# Patient Record
Sex: FEMALE | Race: WHITE | NOT HISPANIC OR LATINO | Employment: FULL TIME | URBAN - METROPOLITAN AREA
[De-identification: names, ages, dates, MRNs, and addresses within clinical notes are randomized per-mention and may not be internally consistent; named-entity substitution may affect disease eponyms.]

---

## 2018-11-15 ENCOUNTER — TRANSFERRED RECORDS (OUTPATIENT)
Dept: HEALTH INFORMATION MANAGEMENT | Facility: CLINIC | Age: 63
End: 2018-11-15

## 2019-01-18 ENCOUNTER — TRANSFERRED RECORDS (OUTPATIENT)
Dept: HEALTH INFORMATION MANAGEMENT | Facility: CLINIC | Age: 64
End: 2019-01-18

## 2019-04-03 ENCOUNTER — TRANSFERRED RECORDS (OUTPATIENT)
Dept: HEALTH INFORMATION MANAGEMENT | Facility: CLINIC | Age: 64
End: 2019-04-03

## 2019-07-18 DIAGNOSIS — H35.372 EPIRETINAL MEMBRANE (ERM) OF LEFT EYE: ICD-10-CM

## 2019-07-18 DIAGNOSIS — H18.002 KERATIC PRECIPITATES OF LEFT EYE: Primary | ICD-10-CM

## 2019-07-19 ENCOUNTER — OFFICE VISIT (OUTPATIENT)
Dept: OPHTHALMOLOGY | Facility: CLINIC | Age: 64
End: 2019-07-19
Attending: OPHTHALMOLOGY
Payer: COMMERCIAL

## 2019-07-19 DIAGNOSIS — H35.372 EPIRETINAL MEMBRANE (ERM) OF LEFT EYE: ICD-10-CM

## 2019-07-19 DIAGNOSIS — H18.002 KERATIC PRECIPITATES OF LEFT EYE: Primary | ICD-10-CM

## 2019-07-19 PROCEDURE — G0463 HOSPITAL OUTPT CLINIC VISIT: HCPCS | Mod: ZF

## 2019-07-19 PROCEDURE — 92133 CPTRZD OPH DX IMG PST SGM ON: CPT | Mod: ZF | Performed by: OPHTHALMOLOGY

## 2019-07-19 PROCEDURE — 92134 CPTRZ OPH DX IMG PST SGM RTA: CPT | Mod: ZF | Performed by: OPHTHALMOLOGY

## 2019-07-19 RX ORDER — CHOLECALCIFEROL (VITAMIN D3) 1250 MCG
1 CAPSULE ORAL DAILY
COMMUNITY
Start: 2010-01-01

## 2019-07-19 RX ORDER — ASPIRIN 81 MG/1
81 TABLET, CHEWABLE ORAL DAILY
COMMUNITY

## 2019-07-19 ASSESSMENT — REFRACTION_WEARINGRX
OS_SPHERE: -10.00
OD_CYLINDER: +2.00
OS_ADD: +2.25
OD_ADD: +2.25
OD_AXIS: 155
OS_AXIS: 015
SPECS_TYPE: PAL
OS_CYLINDER: +1.75
OD_SPHERE: -8.00

## 2019-07-19 ASSESSMENT — VISUAL ACUITY
METHOD: SNELLEN - LINEAR
OS_PH_CC: 20/40
OD_CC: 20/25
OD_CC+: +2
CORRECTION_TYPE: GLASSES
OS_CC: 20/100

## 2019-07-19 ASSESSMENT — TONOMETRY
IOP_METHOD: ICARE
OD_IOP_MMHG: 14
OS_IOP_MMHG: 15

## 2019-07-19 ASSESSMENT — EXTERNAL EXAM - RIGHT EYE: OD_EXAM: NORMAL

## 2019-07-19 ASSESSMENT — CUP TO DISC RATIO
OS_RATIO: 0.6
OD_RATIO: 0.3

## 2019-07-19 ASSESSMENT — EXTERNAL EXAM - LEFT EYE: OS_EXAM: NORMAL

## 2019-07-19 ASSESSMENT — SLIT LAMP EXAM - LIDS
COMMENTS: NORMAL
COMMENTS: NORMAL

## 2019-07-19 NOTE — LETTER
7/19/2019       RE: Chloé Bui  [address] [facility] [address]     Dear Colleague,    Thank you for referring your patient, Chloé Bui, to the EYE CLINIC at [facility]. Please see a copy of my visit note below.    HPI: Chloé Bui is a 63 year old female referred by Dr. Shetty for evaluation of possible uveitis left eye, given endothelial keratitic precipitates left eye first noted 1.18.19. Ms. Bui denies any history of uveitis and specifically states that the only time she used corticosteroid eye drops was post-operatively. She also denies history of intermittent blurry vision, intermittent eye redness/pain, and periocular rash/itching/pain.    Current ocular medications: none.  Prior ocular medications: latanoprost left eye (used post-PPV left eye) and standard post-surgical drops: atropine, Maxitrol.    Ocular history:   1. S/p Pars plana vitrectomy (PPV)/IVT/MP/AFx/ICG/laser for Epiretinal membrane left eye (Crystal, 11.15.18).   - History of mildly elevated intraocular pressure (20), possible steroid response, following  PPV   - Topical corticosteroids stopped at 2 week postop visit  2. ?Vitreous hemorrhage left eye, mentioned as a diagnosis in Dr. Rojas's notes without further explanation. Only notes from 11.16.18 - 1.18.19 were available for review.  3. Cataract left eye > right eye.    Medical history:  1. 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 Arthritis  3. Cervical disc bulging  4. Factor V Leiden  5. S/p removal of retained placenta (1981)    Review of systems: Positive for numbness/tingling in fingers and toes, joint pain/stiffness, and back pain. Negative for fatigue, weight loss, fevers, chills, night sweats, frequent headaches, seizure, fainting, , weakness, oral ulcers, genital ulcers, pathergy reaction, recurrent nosebleeds, sinusitis, hearing loss, tinnitus, chronic cough, chest  pain, shortness of breath, skin rash, tick bite, easy bruising, easy bleeding,  recurrent diarrhea, bloody stools, bloody urine.    Family history: Positive for psoriasis (sister), retinal detachment (2 sisters), glaucoma (mother), Retinopathy of prematurity (ROP) (brother).    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 Stopped smoking 25 years ago. No IV drug use. Drinks an average of 1 alcoholic beverage per day. Has lived in [place] well as the [place].    Laboratory/Imaging Results:  Chest x-ray 12.2.13 read as normal, without sequelae of old granulomatous disease.    Ocular Imaging:  Macular OCT 7.19.19:  Right eye: minimal non-central ERM, abnormal foveal contour without thickening, no cystoid macular edema  Left eye: residual temporal ERM, blunted foveal contour, no cystoid macular edema    Retinal nerve fiber layer OCT 7.19.19: within normal limits both eyes    Impression/Plan:  1. Central corneal endothelial pigment / keratic precipitates left eye. No active uveitis on examination today. No other findings suggestive of prior uveitis. In the absence of suggestive history and with normal corneal sensation, normal iris appearance without atrophy or TIDs, and no other findings to indicate active or prior uveitis, this finding is most likely post-surgical (note that topical corticosteroids were stopped 2 weeks post-op) or secondary to Fuchs heterochromic iridocyclitis (which does not require uveitis treatment) rather than a finding indicating a true primary uveitis. 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   - Observe    2. Cataract left eye > right eye, visually significant left eye.   - I recommend proceeding with cataract surgery left eye, without uveitis precautions. In particular, I do not recommend empiric anti-viral treatment at this time. I do recommend close  observation following cataract surgery to carefully monitor inflammation and treat appropriately.     3. Glaucoma suspect based on C:D asymmetry.   - Continue care with Dr. Shetty    Return to uveitis clinic as needed. I am happy to see Ms. Bui back if there are subsequent findings suggestive of uveitis.      Attending Physician Attestation:  Complete documentation of historical and exam elements from today's encounter can be found in the full encounter summary report (not reduplicated in this progress note).  I personally obtained the chief complaint(s) and history of present illness.  I confirmed and edited as necessary the review of systems, past medical/surgical history, family history, social history, and examination findings as documented by others; and I examined the patient myself.  I personally reviewed the relevant tests, images, and reports as documented above.  I formulated and edited as necessary the assessment and plan and discussed the findings and management plan with the patient and family.  - Lauren Garcia M.D.      Again, thank you for allowing me to participate in the care of your patient.      Sincerely,    Lauren Garcia MD

## 2019-07-19 NOTE — NURSING NOTE
"Chief Complaints and History of Present Illnesses   Patient presents with     Consult For     Chief Complaint(s) and History of Present Illness(es)     Consult For     Laterality: left eye    Quality: blurred vision    Severity: severe    Frequency: constantly    Associated symptoms: floaters.  Negative for eye pain and redness    Treatments tried: no treatments    Pain scale: 0/10              Comments     Referred by Dr Elieser Shetty for keratic precipitates LE    Vision \"seems worse\" in the LE since JHONNY 4/03/19. Has been updating gls and \"just can't keep up\" with the vision changes in the LE. RE, per Pt is \"asymptomatic.\"    F&F BE, longstanding and unchanging.     PoH: Vitrectomy LE 11.2018    Diane Lynn COT 9:15 AM July 19, 2019                     "

## 2019-07-19 NOTE — LETTER
7/19/2019       RE: Chloé Bui  [address] [facility] [address]     Dear Colleague,    Thank you for referring your patient, Chloé Bui, to the EYE CLINIC at [facility]. Please see a copy of my visit note below.    HPI: Chloé Bui is a 63 year old female referred by Dr. Shetty for evaluation of possible uveitis left eye, given endothelial keratitic precipitates left eye first noted 1.18.19. Ms. Bui denies any history of uveitis and specifically states that the only time she used corticosteroid eye drops was post-operatively. She also denies history of intermittent blurry vision, intermittent eye redness/pain, and periocular rash/itching/pain.    Current ocular medications: none.  Prior ocular medications: latanoprost left eye (used post-PPV left eye) and standard post-surgical drops: atropine, Maxitrol.    Ocular history:   1. S/p Pars plana vitrectomy (PPV)/IVT/MP/AFx/ICG/laser for Epiretinal membrane left eye (Crystal, 11.15.18).   - History of mildly elevated intraocular pressure (20), possible steroid response, following  PPV   - Topical corticosteroids stopped at 2 week postop visit  2. ?Vitreous hemorrhage left eye, mentioned as a diagnosis in Dr. Rojas's notes without further explanation. Only notes from 11.16.18 - 1.18.19 were available for review.  3. Cataract left eye > right eye.    Medical history:  1. 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 Arthritis  3. Cervical disc bulging  4. Factor V Leiden  5. S/p removal of retained placenta (1981)    Review of systems: Positive for numbness/tingling in fingers and toes, joint pain/stiffness, and back pain. Negative for fatigue, weight loss, fevers, chills, night sweats, frequent headaches, seizure, fainting, , weakness, oral ulcers, genital ulcers, pathergy reaction, recurrent nosebleeds, sinusitis, hearing loss, tinnitus, chronic cough, chest  pain, shortness of breath, skin rash, tick bite, easy bruising, easy bleeding,  recurrent diarrhea, bloody stools, bloody urine.    Family history: Positive for psoriasis (sister), retinal detachment (2 sisters), glaucoma (mother), Retinopathy of prematurity (ROP) (brother).    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 Stopped smoking 25 years ago. No IV drug use. Drinks an average of 1 alcoholic beverage per day. Has lived in [place] well as the [place].    Laboratory/Imaging Results:  Chest x-ray 12.2.13 read as normal, without sequelae of old granulomatous disease.    Ocular Imaging:  Macular OCT 7.19.19:  Right eye: minimal non-central ERM, abnormal foveal contour without thickening, no cystoid macular edema  Left eye: residual temporal ERM, blunted foveal contour, no cystoid macular edema    Retinal nerve fiber layer OCT 7.19.19: within normal limits both eyes    Impression/Plan:  1. Central corneal endothelial pigment / keratic precipitates left eye. No active uveitis on examination today. No other findings suggestive of prior uveitis. In the absence of suggestive history and with normal corneal sensation, normal iris appearance without atrophy or TIDs, and no other findings to indicate active or prior uveitis, this finding is most likely post-surgical (note that topical corticosteroids were stopped 2 weeks post-op) or secondary to Fuchs heterochromic iridocyclitis (which does not require uveitis treatment) rather than a finding indicating a true primary uveitis. 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   - Observe    2. Cataract left eye > right eye, visually significant left eye.   - I recommend proceeding with cataract surgery left eye, without uveitis precautions. In particular, I do not recommend empiric anti-viral treatment at this time. I do recommend close  observation following cataract surgery to carefully monitor inflammation and treat appropriately.     3. Glaucoma suspect based on enlarged C:D and C:D asymmetry.   - Continue care with Dr. Shetty    Return to uveitis clinic as needed. I am happy to see Ms. Bui back if there are subsequent findings suggestive of uveitis.      Attending Physician Attestation:  Complete documentation of historical and exam elements from today's encounter can be found in the full encounter summary report (not reduplicated in this progress note).  I personally obtained the chief complaint(s) and history of present illness.  I confirmed and edited as necessary the review of systems, past medical/surgical history, family history, social history, and examination findings as documented by others; and I examined the patient myself.  I personally reviewed the relevant tests, images, and reports as documented above.  I formulated and edited as necessary the assessment and plan and discussed the findings and management plan with the patient and family.  - Lauren Garcia M.D.      Again, thank you for allowing me to participate in the care of your patient.      Sincerely,    Lauren Garcia MD

## 2019-07-19 NOTE — PROGRESS NOTES
HPI: Chloé Bui is a 63 year old female referred by Dr. Shetty for evaluation of possible uveitis left eye, given endothelial keratitic precipitates left eye first noted 1.18.19. Ms. Bui denies any history of uveitis and specifically states that the only time she used corticosteroid eye drops was post-operatively. She also denies history of intermittent blurry vision, intermittent eye redness/pain, and periocular rash/itching/pain.    Current ocular medications: none.  Prior ocular medications: latanoprost left eye (used post-PPV left eye) and standard post-surgical drops: atropine, Maxitrol.    Ocular history:   1. S/p Pars plana vitrectomy (PPV)/IVT/MP/AFx/ICG/laser for Epiretinal membrane left eye (Crystal, 11.15.18).   - History of mildly elevated intraocular pressure (20), possible steroid response, following  PPV   - Topical corticosteroids stopped at 2 week postop visit  2. ?Vitreous hemorrhage left eye, mentioned as a diagnosis in Dr. Rojas's notes without further explanation. Only notes from 11.16.18 - 1.18.19 were available for review.  3. Cataract left eye > right eye.    Medical history:  1. 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 Arthritis  3. Cervical disc bulging  4. Factor V Leiden  5. S/p removal of retained placenta (1981)    Review of systems: Positive for numbness/tingling in fingers and toes, joint pain/stiffness, and back pain. Negative for fatigue, weight loss, fevers, chills, night sweats, frequent headaches, seizure, fainting, , weakness, oral ulcers, genital ulcers, pathergy reaction, recurrent nosebleeds, sinusitis, hearing loss, tinnitus, chronic cough, chest pain, shortness of breath, skin rash, tick bite, easy bruising, easy bleeding,  recurrent diarrhea, bloody stools, bloody urine.    Family history: Positive for psoriasis (sister), retinal detachment (2 sisters), glaucoma (mother), Retinopathy of prematurity (ROP)  (brother).    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 Stopped smoking 25 years ago. No IV drug use. Drinks an average of 1 alcoholic beverage per day. Has lived in [place] well as the [place].    Laboratory/Imaging Results:  Chest x-ray 12.2.13 read as normal, without sequelae of old granulomatous disease.    Ocular Imaging:  Macular OCT 7.19.19:  Right eye: minimal non-central ERM, abnormal foveal contour without thickening, no cystoid macular edema  Left eye: residual temporal ERM, blunted foveal contour, no cystoid macular edema    Retinal nerve fiber layer OCT 7.19.19: within normal limits both eyes    Impression/Plan:  1. Central corneal endothelial pigment / keratic precipitates left eye. No active uveitis on examination today. No other findings suggestive of prior uveitis. In the absence of suggestive history and with normal corneal sensation, normal iris appearance without atrophy or TIDs, and no other findings to indicate active or prior uveitis, this finding is most likely post-surgical (note that topical corticosteroids were stopped 2 weeks post-op) or secondary to Fuchs heterochromic iridocyclitis (which does not require uveitis treatment) rather than a finding indicating a true primary uveitis. 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   - Observe    2. Cataract left eye > right eye, visually significant left eye.   - I recommend proceeding with cataract surgery left eye, without uveitis precautions. In particular, I do not recommend empiric anti-viral treatment at this time. I do recommend close observation following cataract surgery to carefully monitor inflammation and treat appropriately.     3. Glaucoma suspect based on C:D asymmetry.   - Continue care with Dr. Shetty    Return to uveitis clinic as needed. I am happy to see Ms. Bui back if there are  subsequent findings suggestive of uveitis.      Attending Physician Attestation:  Complete documentation of historical and exam elements from today's encounter can be found in the full encounter summary report (not reduplicated in this progress note).  I personally obtained the chief complaint(s) and history of present illness.  I confirmed and edited as necessary the review of systems, past medical/surgical history, family history, social history, and examination findings as documented by others; and I examined the patient myself.  I personally reviewed the relevant tests, images, and reports as documented above.  I formulated and edited as necessary the assessment and plan and discussed the findings and management plan with the patient and family.  - Lauren Garcia M.D.

## 2019-07-24 ENCOUNTER — TELEPHONE (OUTPATIENT)
Dept: OPHTHALMOLOGY | Facility: CLINIC | Age: 64
End: 2019-07-24

## 2019-07-24 NOTE — TELEPHONE ENCOUNTER
Note to Dr. Garcia/facilitator for assistance.  Aditya Zuniga RN 10:47 AM 07/24/19        [facility] Call Center    Phone Message    May a detailed message be left on voicemail: yes    Reason for Call: Form or Letter   Type or form/letter needing completion: Letter stating that Dr. Garcia want the pt to have cataract surgery.  Dr. Shetty will not see the pt until he receives this letter.  Provider: Dr. Garcia   Date form needed: Asap  Once completed: Fax form to:     (Pt will call back to give fax#)      Action Taken: Message routed to:  [facility] Eye clinic

## 2019-07-24 NOTE — TELEPHONE ENCOUNTER
Reviewed Dr. Garcia back in clinic Friday and likely will be able to review request then  Stated would ask facilitator to contact pt once complete.    Dr. Shetty would like to know if ok to go ahead with cataract surgery per Dr. Garcia    Note to facilitator  Previous message also sent.  Aditya Zuniga RN 12:28 PM 07/24/19            [facility] Call [facility]    Phone Message    May a detailed message be left on voicemail: yes, Pt is wanting to get a call back when the letter has been sent to Dr. Shetty.     Reason for Call: Form or Letter   Type or form/letter needing completion: Letter stating that Dr. Garcia want the pt to have cataract surgery.  Dr. Shetty will not see the pt until he receives this letter  Provider: Dr. Garcia    Date form needed: ASAP  Once completed: Fax form to: [phone]      Action Taken: Message routed to:  Other: [facility]

## 2019-07-30 ENCOUNTER — TELEPHONE (OUTPATIENT)
Dept: OPHTHALMOLOGY | Facility: CLINIC | Age: 64
End: 2019-07-30

## 2019-07-30 NOTE — TELEPHONE ENCOUNTER
Note to facilitator to assist my review of information if completed or assist my update to pt when completion anticipated    Aditya Zuniga RN 8:47 AM 07/30/19            [facility] Call Center     Phone Message     May a detailed message be left on voicemail: yes, Pt is wanting to get a call back when the letter has been sent to Dr. Shetty.      Reason for Call: Form or Letter   Type or form/letter needing completion: Letter stating that Dr. Garcia want the pt to have cataract surgery.  Dr. Shetty will not see the pt until he receives this letter  Provider: Dr. Garcia    Date form needed: ASAP  Once completed: Fax form to: [phone]       --please call pt asap to advise of status on this thanks     Action Taken: Message routed to:  Other: [facility]

## 2020-03-11 ENCOUNTER — HEALTH MAINTENANCE LETTER (OUTPATIENT)
Age: 65
End: 2020-03-11

## 2021-01-03 ENCOUNTER — HEALTH MAINTENANCE LETTER (OUTPATIENT)
Age: 66
End: 2021-01-03

## 2021-04-25 ENCOUNTER — HEALTH MAINTENANCE LETTER (OUTPATIENT)
Age: 66
End: 2021-04-25

## 2021-10-10 ENCOUNTER — HEALTH MAINTENANCE LETTER (OUTPATIENT)
Age: 66
End: 2021-10-10

## 2022-05-21 ENCOUNTER — HEALTH MAINTENANCE LETTER (OUTPATIENT)
Age: 67
End: 2022-05-21

## 2022-09-18 ENCOUNTER — HEALTH MAINTENANCE LETTER (OUTPATIENT)
Age: 67
End: 2022-09-18

## 2023-01-29 ENCOUNTER — HEALTH MAINTENANCE LETTER (OUTPATIENT)
Age: 68
End: 2023-01-29

## 2023-05-07 ENCOUNTER — HEALTH MAINTENANCE LETTER (OUTPATIENT)
Age: 68
End: 2023-05-07

## 2024-02-25 ENCOUNTER — HEALTH MAINTENANCE LETTER (OUTPATIENT)
Age: 69
End: 2024-02-25